# Patient Record
Sex: MALE | Race: WHITE | URBAN - NONMETROPOLITAN AREA
[De-identification: names, ages, dates, MRNs, and addresses within clinical notes are randomized per-mention and may not be internally consistent; named-entity substitution may affect disease eponyms.]

---

## 2017-09-08 ENCOUNTER — SURGERY (OUTPATIENT)
Dept: SURGERY | Facility: OTHER | Age: 82
End: 2017-09-08

## 2017-09-08 ENCOUNTER — HISTORY (OUTPATIENT)
Dept: MEDSURG UNIT | Facility: OTHER | Age: 82
End: 2017-09-08

## 2017-09-08 ENCOUNTER — HOSPITAL ENCOUNTER (OUTPATIENT)
Dept: MEDSURG UNIT | Facility: OTHER | Age: 82
Discharge: HOME | End: 2017-09-08
Attending: UROLOGY | Admitting: UROLOGY

## 2017-09-08 ENCOUNTER — HOSPITAL ENCOUNTER (EMERGENCY)
Facility: HOSPITAL | Age: 82
Discharge: HOME | End: 2017-09-08
Attending: NURSE PRACTITIONER | Admitting: NURSE PRACTITIONER
Payer: MEDICARE

## 2017-09-08 VITALS
RESPIRATION RATE: 20 BRPM | TEMPERATURE: 96 F | SYSTOLIC BLOOD PRESSURE: 153 MMHG | OXYGEN SATURATION: 98 % | HEART RATE: 72 BPM | DIASTOLIC BLOOD PRESSURE: 72 MMHG

## 2017-09-08 DIAGNOSIS — R33.9 URINARY RETENTION: ICD-10-CM

## 2017-09-08 DIAGNOSIS — R33.9 RETENTION OF URINE: ICD-10-CM

## 2017-09-08 DIAGNOSIS — E11.8 TYPE 2 DIABETES MELLITUS WITH COMPLICATION, UNSPECIFIED LONG TERM INSULIN USE STATUS: ICD-10-CM

## 2017-09-08 DIAGNOSIS — N47.1 PHIMOSIS: ICD-10-CM

## 2017-09-08 LAB
ALBUMIN SERPL-MCNC: 3.6 G/DL (ref 3.4–5)
ALP SERPL-CCNC: 72 U/L (ref 40–150)
ALT SERPL W P-5'-P-CCNC: 18 U/L (ref 0–70)
ANION GAP SERPL CALCULATED.3IONS-SCNC: 11 MMOL/L (ref 3–14)
AST SERPL W P-5'-P-CCNC: 12 U/L (ref 0–45)
BASOPHILS # BLD AUTO: 0 10E9/L (ref 0–0.2)
BASOPHILS NFR BLD AUTO: 0.3 %
BILIRUB SERPL-MCNC: 0.5 MG/DL (ref 0.2–1.3)
BUN SERPL-MCNC: 32 MG/DL (ref 7–30)
CALCIUM SERPL-MCNC: 9.1 MG/DL (ref 8.5–10.1)
CHLORIDE SERPL-SCNC: 105 MMOL/L (ref 94–109)
CO2 SERPL-SCNC: 24 MMOL/L (ref 20–32)
CREAT SERPL-MCNC: 1.45 MG/DL (ref 0.66–1.25)
DIFFERENTIAL METHOD BLD: ABNORMAL
EOSINOPHIL # BLD AUTO: 0.2 10E9/L (ref 0–0.7)
EOSINOPHIL NFR BLD AUTO: 1.3 %
ERYTHROCYTE [DISTWIDTH] IN BLOOD BY AUTOMATED COUNT: 14.4 % (ref 10–15)
GFR SERPL CREATININE-BSD FRML MDRD: 46 ML/MIN/1.7M2
GLUCOSE SERPL-MCNC: 224 MG/DL (ref 70–99)
HCT VFR BLD AUTO: 40.3 % (ref 40–53)
HGB BLD-MCNC: 13.4 G/DL (ref 13.3–17.7)
IMM GRANULOCYTES # BLD: 0.1 10E9/L (ref 0–0.4)
IMM GRANULOCYTES NFR BLD: 0.5 %
LYMPHOCYTES # BLD AUTO: 2.3 10E9/L (ref 0.8–5.3)
LYMPHOCYTES NFR BLD AUTO: 19.6 %
MCH RBC QN AUTO: 30 PG (ref 26.5–33)
MCHC RBC AUTO-ENTMCNC: 33.3 G/DL (ref 31.5–36.5)
MCV RBC AUTO: 90 FL (ref 78–100)
MONOCYTES # BLD AUTO: 0.9 10E9/L (ref 0–1.3)
MONOCYTES NFR BLD AUTO: 7.6 %
NEUTROPHILS # BLD AUTO: 8.3 10E9/L (ref 1.6–8.3)
NEUTROPHILS NFR BLD AUTO: 70.7 %
NRBC # BLD AUTO: 0 10*3/UL
NRBC BLD AUTO-RTO: 0 /100
PLATELET # BLD AUTO: 239 10E9/L (ref 150–450)
POTASSIUM SERPL-SCNC: 4.4 MMOL/L (ref 3.4–5.3)
PROT SERPL-MCNC: 7.9 G/DL (ref 6.8–8.8)
RBC # BLD AUTO: 4.47 10E12/L (ref 4.4–5.9)
SODIUM SERPL-SCNC: 140 MMOL/L (ref 133–144)
WBC # BLD AUTO: 11.8 10E9/L (ref 4–11)

## 2017-09-08 PROCEDURE — 85025 COMPLETE CBC W/AUTO DIFF WBC: CPT | Performed by: NURSE PRACTITIONER

## 2017-09-08 PROCEDURE — 99213 OFFICE O/P EST LOW 20 MIN: CPT

## 2017-09-08 PROCEDURE — 99203 OFFICE O/P NEW LOW 30 MIN: CPT | Performed by: NURSE PRACTITIONER

## 2017-09-08 PROCEDURE — 36415 COLL VENOUS BLD VENIPUNCTURE: CPT | Performed by: NURSE PRACTITIONER

## 2017-09-08 PROCEDURE — 80053 COMPREHEN METABOLIC PANEL: CPT | Performed by: NURSE PRACTITIONER

## 2017-09-08 ASSESSMENT — ENCOUNTER SYMPTOMS
CONSTITUTIONAL NEGATIVE: 1
MUSCULOSKELETAL NEGATIVE: 1
RESPIRATORY NEGATIVE: 1
GASTROINTESTINAL NEGATIVE: 1
CARDIOVASCULAR NEGATIVE: 1
FLANK PAIN: 0
DIFFICULTY URINATING: 1

## 2017-09-08 NOTE — ED AVS SNAPSHOT
[facility]    [address] [address]    Phone:  [phone]                                       Pieter Mendoza   MRN: [id number]    Department:  [facility]   Date of Visit:  9/8/2017           After Visit Summary Signature Page     I have received my discharge instructions, and my questions have been answered. I have discussed any challenges I see with this plan with the nurse or doctor.    ..........................................................................................................................................  Patient/Patient Representative Signature      ..........................................................................................................................................  Patient Representative Print Name and Relationship to Patient    ..................................................               ................................................  Date                                            Time    ..........................................................................................................................................  Reviewed by Signature/Title    ...................................................              ..............................................  Date                                                            Time

## 2017-09-08 NOTE — ED AVS SNAPSHOT
[facility]    [address]    [place] [address]    Phone:  [phone]                                       Pieter Mendoza   MRN: [id number]    Department:  [facility]   Date of Visit:  9/8/2017           Patient Information     Date Of Birth          6/30/1931        Your diagnoses for this visit were:     Urinary retention        You were seen by Estrellita Corcoran NP.      Follow-up Information     Follow up with Ray Adkins MD.    Specialty:  Urology    Why:  Proceed to [facility], they will be expecting you.     Contact information:    [facility]  [address]  [place] [address]  [phone]          Discharge Instructions       Follow up with Dr. Adkins at [facility].        Review of your medicines      Notice     You have not been prescribed any medications.            Procedures and tests performed during your visit     CBC with platelets differential    Comprehensive metabolic panel      Orders Needing Specimen Collection     Ordered          09/08/17 1228  UA with Microscopic reflex to Culture - STAT, Prio: STAT, Needs to be Collected     Scheduled Task Status   09/08/17 1228 Collect UA with Microscopic reflex to Culture Open   Order Class:  PCU Collect                  Pending Results     No orders found from 9/6/2017 to 9/9/2017.            Pending Culture Results     No orders found from 9/6/2017 to 9/9/2017.            Thank you for choosing [facility]       Thank you for choosing [facility] for your care. Our goal is always to provide you with excellent care. Hearing back from our patients is one way we can continue to improve our services. Please take a few minutes to complete the written survey that you may receive in the mail after you visit with us. Thank you!        [facility] Information     [facility] lets you send messages to your doctor, view your test results, renew your prescriptions, schedule appointments and  "more. To sign up, go to [email or web address] . Click on \"Log in\" on the left side of the screen, which will take you to the Welcome page. Then click on \"Sign up Now\" on the right side of the page.     You will be asked to enter the access code listed below, as well as some personal information. Please follow the directions to create your username and password.     Your access code is: [email or web address]  Expires: 2017  2:08 PM     Your access code will  in 90 days. If you need help or a new code, please call your [place] clinic or [phone].        Care EveryWhere ID     This is your Care EveryWhere ID. This could be used by other organizations to access your [place] [facility] medical records  [id number]        Equal Access to Services     EMILIA CUNNINGHAM : Marcia Fraser, lewis bailey, abad stone, gordon chakraborty. So [facility] [phone].    ATENCIÓN: Si habla español, tiene a patten disposición servicios gratuitos de asistencia lingüística. Llame al [phone].    We comply with applicable federal civil rights laws and [place] laws. We do not discriminate on the basis of race, color, national origin, age, disability sex, sexual orientation or gender identity.            After Visit Summary       This is your record. Keep this with you and show to your community pharmacist(s) and doctor(s) at your next visit.                  "

## 2017-09-08 NOTE — ED PROVIDER NOTES
History     Chief Complaint   Patient presents with     Dysuria     started a few days ago. No fever, no back pain. history of bladder infections     The history is provided by the patient, the spouse and a relative.     Pieter Mendoza is a 86 year old male who presents with urinary retention. Has a history of UTIs. Is here from [place] visiting family. Last UTI was 7/13/17.  Family reports that Pieter does not bathe on a regular basis and this is the cause of his infections. Has refused circumcision in the past related to his repeated infections.     I have reviewed the Medications, Allergies, Past Medical and Surgical History, and Social History in the Epic system.    Allergies: No Known Allergies      No current facility-administered medications on file prior to encounter.   No current outpatient prescriptions on file prior to encounter.    There is no problem list on file for this patient.      No past surgical history on file.    Social History   Substance Use Topics     Smoking status: Not on file     Smokeless tobacco: Not on file     Alcohol use Not on file         There is no immunization history on file for this patient.    BMI: There is no height or weight on file to calculate BMI.      Review of Systems   Constitutional: Negative.    HENT: Negative.    Respiratory: Negative.    Cardiovascular: Negative.    Gastrointestinal: Negative.    Genitourinary: Positive for difficulty urinating, penile swelling and urgency. Negative for flank pain and testicular pain.   Musculoskeletal: Negative.    Skin: Negative.        Physical Exam   BP: (!) 190/59  Heart Rate: 89  Temp: 97.9  F (36.6  C)  Resp: 20  SpO2: 98 %  Physical Exam   Constitutional: He is oriented to person, place, and time. Vital signs are normal. He appears well-developed and well-nourished. He is active and cooperative.  Non-toxic appearance. He does not have a sickly appearance. He does not appear ill. No distress.   HENT:   Head: Normocephalic  and atraumatic.   Cardiovascular: Normal rate and normal heart sounds.  An irregular rhythm present.   Pulmonary/Chest: Effort normal and breath sounds normal. No respiratory distress. He has no decreased breath sounds. He has no wheezes.   Abdominal: Soft. Bowel sounds are normal. There is tenderness in the suprapubic area. There is no CVA tenderness.   Genitourinary: Testes normal. Uncircumcised. Penile tenderness (With swelling) present. No discharge found.   Genitourinary Comments: Attempted to place a straight catheter and was not successful.   Consulted with ED provider who advised to speak with Urology. Call placed to Dr. Adkins at [facility]. Advised to try a urojet and 18 Vincentian coude to drain urine. Again, not able to get beyond tip of urethra. Called Dr. Adkins again, discussed having a SP catheter placed by Radiology or have patient transferred to [facility]. Otherwise could also consult with [facility] or [facility].    Musculoskeletal:   Ambulates with a cane.    Neurological: He is alert and oriented to person, place, and time.   Skin: Skin is warm and dry. He is not diaphoretic.   Psychiatric: He has a normal mood and affect. His speech is normal and behavior is normal.   Nursing note and vitals reviewed.      ED Course     ED Course     Procedures             Attempted to places straight catheter and coude catheter with urojet without success.    Medications   lidocaine 2 % (URO-JET) jelly 10 mL (not administered)     Assessments & Plan (with Medical Decision Making)       I had a discussion with Pieter regarding their symptoms, exam, and laboratory results.    Discussed options with patient, wife and daughter. They will have patient assessed by Dr. Adkins in [place]. Daughter will drive him there.     I answered all questions to the best of my ability. Pieter and family voiced understanding of the plan, was agreeable, and had no further questions or concerns.      I have reviewed the nursing notes.    I  have reviewed the findings, diagnosis, plan and need for follow up with the patient.  Discussed patient with Nely, house supervisor at [facility], pt will be a direct admit. Dr. Adkins to place orders when patient arrives.   Labs and notes to be sent with patient.     Final diagnoses:   Urinary retention   Type 2 diabetes mellitus with complication, unspecified long term insulin use status (H)       9/8/2017   [facility]        Estrellita Corcoran NP  09/08/17 4706

## 2017-09-08 NOTE — ED NOTES
Patient presents with swollen penis x 2 days.  Last urination was yesterday.  Patient feels that he has a UTI

## 2017-09-10 LAB
CULTURE - HISTORICAL: ABNORMAL
CULTURE - HISTORICAL: ABNORMAL

## 2017-09-14 ENCOUNTER — HISTORY (OUTPATIENT)
Dept: UROLOGY | Facility: OTHER | Age: 82
End: 2017-09-14

## 2017-09-14 ENCOUNTER — OFFICE VISIT - GICH (OUTPATIENT)
Dept: UROLOGY | Facility: OTHER | Age: 82
End: 2017-09-14

## 2017-09-14 DIAGNOSIS — R33.9 RETENTION OF URINE: ICD-10-CM

## 2017-12-27 NOTE — PROGRESS NOTES
Patient Information     Patient Name MRN Sex     Pieter Mendoza [id number] Male 1931      Progress Notes by Lali Denson RN at 2017  9:17 PM     Author:  Lali Denson RN Service:  (none) Author Type:  NURS- Registered Nurse     Filed:  2017  9:20 PM Date of Service:  2017  9:17 PM Status:  Signed     :  Lali Denson RN (NURS- Registered Nurse)            Discharge Note    Data:  Pieter Mendoza has been discharged home at  via wheelchair accompanied by Nursing Assistant and Family.      Action:  Written discharge/follow-up instructions were provided to patient, Patient, Significant Other and Daughter(s) and caregiver. Prescriptions : None.  Belongings sent with patient and Spouse and Daughter(s). Medications from home sent with patient/family: Not Applicable  Equipment cane, was sent with the patient/family.     Response:  Patient and Patient, Spouse and Daughter(s) verbalized understanding of discharge instructions, reason for discharge, and necessary follow-up appointments.     Catheter care education was demonstrated and explained to patient, spouse, and daughter. They were shown how to switch to leg bag and back to hallman catheter bag. They all verbalized understanding of this and also on how to apply ointment to surgical site. They deny any questions or concerns and hallman is draining clear yellow urine. VSS.    Lali Denson RN ....................  2017   9:20 PM

## 2017-12-27 NOTE — PROGRESS NOTES
Patient Information     Patient Name MRN Sex     Pieter Mendoza [id number] Male 1931      Progress Notes by Lali Denson RN at 2017  7:35 PM     Author:  Lali Denson RN Service:  (none) Author Type:  NURS- Registered Nurse     Filed:  2017  8:21 PM Date of Service:  2017  7:35 PM Status:  Signed     :  Lali Denson RN (NURS- Registered Nurse)            Admission Note    Data:  Pieter Mendoza admitted to [facility] from PACU via cart.      Action:  Family and Dr. Adkins and Dr. Neal have been notified of admission.      Response:  Patient tolerated transfer. and Patient is stable. Patient has LR infusing. Patient's BP is elevated 180s/80s. Patient denies pain. Do intact with small amount of bleeding at meatus. Urine is clear and yellow.     Lali Denson RN ....................  2017   8:21 PM

## 2017-12-27 NOTE — PROGRESS NOTES
Patient Information     Patient Name MRN Sex Pieter Merchant [id number] Male 1931      Progress Notes by Vanessa Tyson at 2017  6:20 PM     Author:  Vanessa Tyson Service:  (none) Author Type:  NURS- Registered Nurse     Filed:  2017  6:27 PM Date of Service:  2017  6:20 PM Status:  Signed     :  Vanessa Tyson (NURS- Registered Nurse)            Pt to Pre-Op. Report given.  Vanessa Tyson RN ....................  2017   6:26 PM

## 2017-12-27 NOTE — PROGRESS NOTES
Patient Information     Patient Name MRN Sex     Pieter Mendoza [id number] Male 1931      Progress Notes by Vanessa Tyson at 2017  3:43 PM     Author:  Vanessa Tyson Service:  (none) Author Type:  NURS- Registered Nurse     Filed:  2017  3:44 PM Date of Service:  2017  3:43 PM Status:  Signed     :  Vanessa Tyson (NURS- Registered Nurse)            Admission Note    Data:  Pieter Mendoza admitted to [facility] Room 315 from home/direct admit via wheelchair.      Action:  Family and Dr. Adkins have been notified of admission.      Response:  Patient tolerated transfer. and Patient is stable.     Vanessa Tyson RN ....................  2017   3:44 PM

## 2017-12-28 NOTE — PROGRESS NOTES
Patient Information     Patient Name MRN Sex     Pieter Mendoza [id number] Male 1931      Progress Notes by Ray Adkins MD at 2017  9:00 AM     Author:  Ray Adkins MD Service:  (none) Author Type:  Physician     Filed:  2017 10:15 AM Encounter Date:  2017 Status:  Signed     :  Ray Adkins MD (Physician)            Type of Visit  Established    Chief Complaint  Urinary retention    HPI  Mr. Mendoza is a 86 y.o. male who presents with urinary retention.  He underwent catheter placement due to inability to urinate 1 week ago.  He underwent dorsal slit due to severe phimosis.  He denies fevers or chills or other problems.  Catheter had been draining well in the last week.      Review of Systems  I reviewed the ROS the patient today.    Nursing Notes:   Meli Madison RN  2017  9:42 AM  Signed  Review of Systems:    Weight loss:    No     Recent fever/chills:  No   Night sweats:   Yes  Current skin rash:  No   Recent hair loss:  No  Heat intolerance:  No   Cold intolerance:  No  Chest pain:   No   Palpitations:   No  Shortness of breath:  No   Wheezing:   No  Constipation:    No   Diarrhea:   No   Nausea:   No   Vomiting:   No   Kidney/side pain:  No   Back pain:   No  Frequent headaches:  No   Dizziness:     No  Leg swelling:   No   Calf pain:    No    Meli Madison RN  2017 10:02 AM  Signed  Void Trial  Verbal order read back by Ray Adkins MD to perform void trial and post void residual bladder scan.  Patient's bladder was filled under gravity with 90mL of sterile saline.  Patient voided 100mL and a small amount that was unmeasured as it leaked out prior to catheter removal.  Post-void residual was measured by ultrasonic bladder scanner: 21 mL  Meli Madison RN.........2017...10:02 AM       Physical Exam  Vitals:     17 0939   BP: 136/64   Pulse: 68   Resp: 12   Temp: 98.7  F (37.1  C)   TempSrc: Temporal   Constitutional: NAD,  WDWN.  Cardiovascular: Regular rate.  Pulmonary/Chest: Respirations are even and non-labored bilaterally.  Abdominal: Soft. No distension, tenderness, masses or guarding. No CVA tenderness.  Extremities: UMESH x 4, Warm. No clubbing.  No cyanosis.    Skin: Pink, warm and dry.  No rashes noted.  : catheter in place draining clear urine  dorsal slit incision is healing    Assessment & Plan  Mr. Mendoza is a 86 y.o. male with urinary retention due to severe phimosis status post dorsal slit who passed void trial today.  [place]  Recommended he follow up with his local urologist.  He can follow-up with me as needed

## 2017-12-28 NOTE — OR ANESTHESIA
Patient Information     Patient Name MRN Sex     Pieter Mendoza [id number] Male 1931      OR Anesthesia by Yfn Cabrera CRNA at 2017  7:33 PM     Author:  Yfn Cabrera CRNA Service:  (none) Author Type:  NURS- Nurse Anesthetist     Filed:  2017  7:33 PM Date of Service:  2017  7:33 PM Status:  Signed     :  Yfn Cabrera CRNA (NURS- Nurse Anesthetist)            Anesthesia Post Operative Care Note    Name: Pieter Mendoza  MRN:   [id number]  :    1931       Procedure Done:  See Surgeon Note        Anesthesia Technique    Anesthetic Type:  MAC       MAC Type:  NC     Oral Trauma:  No    Intraoperative Course   Hemodynamics:  Stable    Ventilation Normal:  Yes Lung Sounds:  Normal      PACU Course    Airway Status:  Extubated     Nondepolarizer Used:       Reversed: N/A   Hemodynamics:  Stable      Hydration: Euvolemic   Temperature:  36.1 - 38.3      Mental Status:  Awake, alert, follows commands   Pain Management:  Adequate   Regional Block:  No      Vital Signs:  Temp: 97.8  F (36.6  C)  Pulse: 84  BP: 141/69  Resp: 16  SpO2: 94 %    O2 Device: Room Air         Level of Nausea: None        Active Lines:  Patient Lines/Drains/Airways Status    Active Line     Name: Placement date: Placement time: Site: Days:    PERIPHERAL VAD Left Hand 17   1618   Hand   less than 1                Intake & Output:       Labs:  No results for input(s): DP5VYCJKQGU, NYU7PSDUOZWS, PHARTERIAL, QOV0ARZGRXMS, D6QKGSCVCGSD in the last 24 hours.    No results for input(s): MAGNESIUM in the last 24 hours.    No results for input(s): GLUCOSEMETER in the last 720 hours.        Yfn Cabrera CRNA ....................  2017   7:33 PM

## 2017-12-28 NOTE — OR POSTOP
Patient Information     Patient Name MRN Sex     Pieter Mendoza [id number] Male 1931      OR PostOp by Balbina Borrego RN at 2017  7:32 PM     Author:  Balbina Borrego RN Service:  (none) Author Type:  NURS- Registered Nurse     Filed:  2017  7:33 PM Date of Service:  2017  7:32 PM Status:  Signed     :  Balbina Borrego RN (NURS- Registered Nurse)            PACU Transfer Note    Pieter Mendoza transferred to [facility] via cart.  Report to [facility].  Equipment used for transport:  None.  Accompanied by:  Registered Nurse    Patient stable and meets phase 1 discharge criteria for transport from PACU.      PACU Respiratory Event Documentation     1) Episodes of Apnea greater than or equal to 10 seconds: no    2) Bradypnea - less than 8 breaths per minute: no    3) Pain score on 0 to 10 scale: 0    4) Pain-sedation mismatch (yes or no): no    5) Repeated 02 desaturation less than 90% (yes or no): no    Anesthesia notified? (yes or no): no    Any of the above events occuring repeatedly in separate 30 minute intervals may be considered recurrent PACU respiratory events.

## 2017-12-28 NOTE — PROCEDURES
Patient Information     Patient Name MRN Sex     Pieter Mendoza [id number] Male 1931      Procedures by Ray Adkins MD at 2017  7:18 PM     Author:  Ray Adkins MD  Service:  (none) Author Type:  Physician     Filed:  2017  7:23 PM  Date of Service:  2017  7:18 PM Status:  Addendum     :  Ray Adkins MD (Physician)        Related Notes: Original Note by Ray Adkins MD (Physician) filed at 2017  7:22 PM            Preoperative diagnosis  Phimosis  Urinary retention    Postoperative diagnosis  Phimosis  Urinary retention    Procedure performed  Dorsal slit  Cystoscopy  Placement of catheter, complicated    Surgeon  Ray Adikns MD    Surgeon(s)/Proceduralist(s) and Assistants (if any)  Surgeon(s):  Ray Adkins MD  Assistant: Maria Luz Oliva  Circulator: Charly Billy RN  PACU Nurse: Balbina Borrego RN  Scrub: [place] CST    Specimen(s)  yes: urine culture    (EBL) Estimated blood loss (ml)  5    Anesthesia  General endotracheal anesthesia  15 mL lidocaine 1% local injection    Complications  None    Specimen  None    Indications  86 y.o. male agreed to undergo the above named procedure after discussion of the alternatives, risks and benefits.  He was in urinary retention and a catheter could not be passed due to phimosis.  Informed consent was obtained.    Procedure  The patient was taken to the operating room and placed supine on the operating table.    Pre-operative antibiotics were administered.      1% lidocaine was instilled at the 12:00 position.  The foreskin was incised at the 12:00 position.  Bleeding vessels were cauterized.    The skin edges were reapproximated using running 3-0 chromic suture.    Ointment with gauze and Coban wrap were loosely applied to the incision.      I performed cystoscopy and there were no tumors or stones.  There was significant debris.  Urine culture was sent.  16F catheter was passed into the bladder and balloon  inflated with 10mL saline.    The patient was awakened and taken to recovery unit without difficulty.   At the end of the procedure all instrument, needle and sponge counts were correct x 2.    Plan  Follow up in 1 week for void trial.

## 2017-12-28 NOTE — OR ANESTHESIA
Patient Information     Patient Name MRN Sex     Pieter Mendoza [id number] Male 1931      OR Anesthesia by Yfn Cabrera CRNA at 2017  6:09 PM     Author:  Yfn Cabrera CRNA Service:  (none) Author Type:  NURS- Nurse Anesthetist     Filed:  2017  6:09 PM Date of Service:  2017  6:09 PM Status:  Signed     :  Yfn Cabrera CRNA (NURS- Nurse Anesthetist)                                                           ANESTHESIA ASSESSMENT    Date: 17 Time: 6:09 PM      Patient:  Pieter Mendoza    Procedure(s) (LRB):  CYSTOSCOPY INSERTION URETERAL CATHETER, Dilation and Dorsal slit (N/A)    Past Medical History:     Diagnosis  Date     Diabetes mellitus (HC)        Past Surgical History:      Procedure  Laterality Date     PACEMAKER GENERATOR         No family history on file.    There is no problem list on file for this patient.      Prescriptions Prior to Admission       Medication  Sig Dispense Refill     fluticasone (50 mcg per actuation) nasal solution (FLONASE) Inhale 1 Spray into both nostrils 2 times daily.       insulin lispro protamine-insulin lispro 75/25 (HUMALOG MIX 75-25 KWIKPEN) 100 unit/mL (75-25) pen Inject 27 Units subcutaneous 2 times daily before meals.       rosuvastatin (CRESTOR) 10 mg tablet Take 10 mg by mouth once daily.         Allergies:No Known Allergies    Review of Systems:  GERD: No  Chest pain: No  Shortness of breath: Yes (With activity. )  Recent fever: Yes (Patient recently treated with antibitotics for respiratory infection. )  Poor exercise tolerance: Yes (Able to ambulate with can about 100 feet before becoming SOB.)  Bleeding tendency: No  Pregnant: No  Anesthesia Complications: None      History     Smoking Status       Former Smoker    Smokeless Tobacco       Never Used      Comment: quit 30 yrs ago     Social History     Social History        Marital status:       Spouse name: N/A     Number of children:  N/A     Years of education:  " N/A     Social History Main Topics         Smoking status:   Former Smoker     Smokeless tobacco:   Never Used      Comment: quit 30 yrs ago      Alcohol use   No     Drug use:   No     Sexual activity:   Not Asked     Other Topics  Concern     None      Social History Narrative      None       Physical Examination:  /69  Pulse 83  Temp 96.3  F (35.7  C)  Resp 18  Ht 1.778 m (5' 10\")  Wt 87.4 kg (192 lb 10.9 oz)  SpO2 97%  BMI 27.65 kg/m2 Body mass index is 27.65 kg/(m^2). Body surface area is 2.08 meters squared.  Dental Condition: Fair     Mallampati Score (Airway): II  Cardiovascular: Abnormal (Pacemaker present. )  Pulmonary: Normal  Other: Abnormal (Diabetic. )    [facility]:    No results for input(s): SODIUM, POTASSIUM, CHLORIDE, JY8MYBAG, ANIONGAP, BUN, CREATININE, BUNCREARATIO, CALCIUM, GLUCOSE, GLUCOSEMETER, KETONES, MAGNESIUM, WBC, HGB, HCT, PLT, ABORH, RHTYPE, PREGURINE, BHCGQL, HCGBETAQUANT, INR in the last 72 hours.          Assessment/Plan:  ASA Class: III  Risk of dental injury discussed: Yes  NPO status confirmed: Yes (Greater than 8 hours. )  Anesthetic Plan: MAC  Risk/Benefit/Alt discussed: Yes  Questions answered: Yes  Emergency Case?: Yes  Labs/ECG/Radiology Reviewed?: Yes      H&P Reviewed.  Patient Examined.      Provider Electronic Signature:  Yfn Cabrera CRNA                "

## 2017-12-29 NOTE — H&P
"Patient Information     Patient Name MRN Pieter Nieto [id number] Male 1931      H&P by Ray Adkins MD at 2017  5:52 PM     Author:  Ray Adkins MD Service:  (none) Author Type:  Physician     Filed:  2017  6:06 PM Date of Service:  2017  5:52 PM Status:  Signed     :  Ray Adkins MD (Physician)            Type of Visit  NPV    Chief Complaint  Urinary retention    HPI  Mr. Mendoza is a 86 y.o. male with history of DM2 who presents with urinary retention.  He is uncircumcised.  He has not voided since yesterday.  He presented to the [facility] and they were unable to pass a catheter.  He has pelvic pain and is dribbling small amounts of urine.  No fevers or chills      Past Medical History  He  has a past medical history of Diabetes mellitus (HC).    Past Surgical History  He  has a past surgical history that includes pacemaker generator.    Medications  insulin    Allergies  No Known Allergies    Social History  He  reports that he has quit smoking. He has never used smokeless tobacco. He reports that he does not drink alcohol or use illicit drugs.  No drug abuse.    Family History  No family history of  cancers    Review of Systems  I personally reviewed the ROS with the patient.    Weight loss: No   Recent fever/chills: No  Night sweats: No   Current skin rash: No   Recent hair loss: No   Heat intolerance: No   Cold intolerance: No   Chest pain: No   Palpitations: No   Shortness of breath: No  Wheezing: No   Constipation: No   Diarrhea: No   Nausea: No    Vomiting: No   Kidney/side pain: nO   Back pain: No  Frequent headaches: No  Dizziness: No   Leg swelling: No   Calf pain: No      Physical Exam  Vitals:     17 1530   BP: 141/69   Pulse: 83   Resp: 18   Temp: 96.3  F (35.7  C)   SpO2: 97%   Weight: 87.4 kg (192 lb 10.9 oz)   Height: 1.778 m (5' 10\")     Constitutional: No acute distress.  Alert and cooperative   Head: NCAT  Eyes: Conjunctivae " normal  Cardiovascular: Regular rate.  Pulmonary/Chest: Respirations are even and non-labored bilaterally, no audible wheezing  Abdominal: Soft. No distension, tenderness, masses or guarding.   Neurological: A + O x 3.  Cranial Nerves II-XII grossly intact.  Extremities: UMESH x 4, Warm. No clubbing.  No cyanosis.    Skin: Pink, warm and dry.  No visible rashes noted.  Psychiatric:  Normal mood and affect  Back:  No left CVA tenderness.  No right CVA tenderness.  Genitourinary:  Nonpalpable bladder  Uncircumcised  Swollen glans, painful, phimosis    Labs  SCr 1.4 9/8/2017    Assessment  Mr. Mendoza is a 86 y.o. male who presents with urinary retention and phimosis.  Discussed options with family and patient.  Discussed circumcision versus dorsal slit.  He prefers dorsal slit.    Plan  Dorsal slit, cystoscopy and possible urethral dilation in the OR

## 2017-12-30 NOTE — NURSING NOTE
Patient Information     Patient Name MRN Sex     Pieter Mendoza [id number] Male 1931      Nursing Note by Meli Madison RN at 2017  9:00 AM     Author:  Meli Madison RN Service:  (none) Author Type:  NURS- Registered Nurse     Filed:  2017 10:02 AM Encounter Date:  2017 Status:  Signed     :  Meli Madison RN (NURS- Registered Nurse)            Void Trial  Verbal order read back by Ray Adkins MD to perform void trial and post void residual bladder scan.  Patient's bladder was filled under gravity with 90mL of sterile saline.  Patient voided 100mL and a small amount that was unmeasured as it leaked out prior to catheter removal.  Post-void residual was measured by ultrasonic bladder scanner: 21 mL  Meli Madison RN.........2017...10:02 AM

## 2017-12-30 NOTE — NURSING NOTE
Patient Information     Patient Name MRN Sex Pieter Merchant [id number] Male 1931      Nursing Note by Meli Madison RN at 2017  9:00 AM     Author:  Meli Madison RN Service:  (none) Author Type:  NURS- Registered Nurse     Filed:  2017  9:42 AM Encounter Date:  2017 Status:  Signed     :  Meli Madison RN (NURS- Registered Nurse)            Review of Systems:    Weight loss:    No     Recent fever/chills:  No   Night sweats:   Yes  Current skin rash:  No   Recent hair loss:  No  Heat intolerance:  No   Cold intolerance:  No  Chest pain:   No   Palpitations:   No  Shortness of breath:  No   Wheezing:   No  Constipation:    No   Diarrhea:   No   Nausea:   No   Vomiting:   No   Kidney/side pain:  No   Back pain:   No  Frequent headaches:  No   Dizziness:     No  Leg swelling:   No   Calf pain:    No

## 2018-01-27 VITALS
RESPIRATION RATE: 12 BRPM | HEART RATE: 68 BPM | SYSTOLIC BLOOD PRESSURE: 136 MMHG | DIASTOLIC BLOOD PRESSURE: 64 MMHG | TEMPERATURE: 98.7 F

## 2018-03-06 ENCOUNTER — DOCUMENTATION ONLY (OUTPATIENT)
Dept: FAMILY MEDICINE | Facility: OTHER | Age: 83
End: 2018-03-06

## 2018-03-06 PROBLEM — N47.1 PHIMOSIS: Status: ACTIVE | Noted: 2017-09-08

## 2018-03-06 PROBLEM — R33.9 RETENTION OF URINE: Status: ACTIVE | Noted: 2017-09-08

## 2018-03-06 RX ORDER — ROSUVASTATIN CALCIUM 10 MG/1
10 TABLET, COATED ORAL DAILY
COMMUNITY

## 2018-03-06 RX ORDER — FLUTICASONE PROPIONATE 50 MCG
1 SPRAY, SUSPENSION (ML) NASAL 2 TIMES DAILY
COMMUNITY

## 2018-03-06 RX ORDER — INSULIN LISPRO 100 [IU]/ML
27 INJECTION, SUSPENSION SUBCUTANEOUS
COMMUNITY